# Patient Record
Sex: FEMALE | Race: WHITE | URBAN - METROPOLITAN AREA
[De-identification: names, ages, dates, MRNs, and addresses within clinical notes are randomized per-mention and may not be internally consistent; named-entity substitution may affect disease eponyms.]

---

## 2021-04-02 ENCOUNTER — OFFICE VISIT (OUTPATIENT)
Dept: URBAN - METROPOLITAN AREA CLINIC 82 | Facility: CLINIC | Age: 71
End: 2021-04-02
Payer: COMMERCIAL

## 2021-04-02 DIAGNOSIS — H52.03 HYPERMETROPIA, BILATERAL: Primary | ICD-10-CM

## 2021-04-02 DIAGNOSIS — H02.126: ICD-10-CM

## 2021-04-02 PROCEDURE — 92004 COMPRE OPH EXAM NEW PT 1/>: CPT | Performed by: OPTOMETRIST

## 2021-04-02 ASSESSMENT — VISUAL ACUITY
OS: NLP
OD: 20/30

## 2021-04-02 ASSESSMENT — INTRAOCULAR PRESSURE: OD: 18

## 2021-04-02 NOTE — IMPRESSION/PLAN
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 Left. Plan: Will continue to observe.

## 2022-06-17 ENCOUNTER — OFFICE VISIT (OUTPATIENT)
Dept: URBAN - METROPOLITAN AREA CLINIC 82 | Facility: CLINIC | Age: 72
End: 2022-06-17
Payer: COMMERCIAL

## 2022-06-17 DIAGNOSIS — H52.03 HYPERMETROPIA, BILATERAL: Primary | ICD-10-CM

## 2022-06-17 PROCEDURE — 92014 COMPRE OPH EXAM EST PT 1/>: CPT | Performed by: OPTOMETRIST

## 2022-06-17 PROCEDURE — 92082 INTERMEDIATE VISUAL FIELD XM: CPT | Performed by: OPTOMETRIST

## 2022-06-17 ASSESSMENT — INTRAOCULAR PRESSURE: OD: 16

## 2022-06-17 ASSESSMENT — KERATOMETRY: OD: 41.38

## 2022-06-17 ASSESSMENT — VISUAL ACUITY: OS: 20/25

## 2022-06-17 NOTE — IMPRESSION/PLAN
Impression: Hypermetropia, bilateral: H52.03. Plan: New glasses RX given today. Discussed diagnosis of cataract OD in detail with patient. No treatment currently recommended. The patient will monitor vision changes and contact us with any decrease in vision.

## 2023-10-02 ENCOUNTER — OFFICE VISIT (OUTPATIENT)
Dept: URBAN - METROPOLITAN AREA CLINIC 82 | Facility: CLINIC | Age: 73
End: 2023-10-02
Payer: COMMERCIAL

## 2023-10-02 DIAGNOSIS — Z13.5 ENCOUNTER FOR SCREENING FOR EYE AND EAR DISORDERS: ICD-10-CM

## 2023-10-02 DIAGNOSIS — H52.03 HYPERMETROPIA, BILATERAL: Primary | ICD-10-CM

## 2023-10-02 PROCEDURE — 92014 COMPRE OPH EXAM EST PT 1/>: CPT | Performed by: OPTOMETRIST

## 2023-10-02 PROCEDURE — 92082 INTERMEDIATE VISUAL FIELD XM: CPT | Performed by: OPTOMETRIST

## 2023-10-02 ASSESSMENT — VISUAL ACUITY
OD: 20/30
OS: NLP

## 2023-10-02 ASSESSMENT — KERATOMETRY: OD: 41.50

## 2023-10-02 ASSESSMENT — INTRAOCULAR PRESSURE: OD: 22

## 2024-04-03 ENCOUNTER — OFFICE VISIT (OUTPATIENT)
Dept: URBAN - METROPOLITAN AREA CLINIC 82 | Facility: CLINIC | Age: 74
End: 2024-04-03
Payer: COMMERCIAL

## 2024-04-03 DIAGNOSIS — H40.051 OCULAR HYPERTENSION, RIGHT EYE: Primary | ICD-10-CM

## 2024-04-03 DIAGNOSIS — H25.11 AGE-RELATED NUCLEAR CATARACT, RIGHT EYE: ICD-10-CM

## 2024-04-03 DIAGNOSIS — H43.829 VITREOMACULAR TRACTION OF EYE: ICD-10-CM

## 2024-04-03 PROCEDURE — 92133 CPTRZD OPH DX IMG PST SGM ON: CPT | Performed by: OPTOMETRIST

## 2024-04-03 PROCEDURE — 99213 OFFICE O/P EST LOW 20 MIN: CPT | Performed by: OPTOMETRIST

## 2024-04-03 ASSESSMENT — INTRAOCULAR PRESSURE: OD: 16

## 2024-05-17 ENCOUNTER — OFFICE VISIT (OUTPATIENT)
Dept: URBAN - METROPOLITAN AREA CLINIC 85 | Facility: CLINIC | Age: 74
End: 2024-05-17
Payer: COMMERCIAL

## 2024-05-17 DIAGNOSIS — H43.391 OTHER VITREOUS OPACITIES, RIGHT EYE: Primary | ICD-10-CM

## 2024-05-17 PROCEDURE — 92134 CPTRZ OPH DX IMG PST SGM RTA: CPT | Performed by: OPHTHALMOLOGY

## 2024-05-17 PROCEDURE — 99204 OFFICE O/P NEW MOD 45 MIN: CPT | Performed by: OPHTHALMOLOGY

## 2024-05-17 ASSESSMENT — INTRAOCULAR PRESSURE
OD: 14
OS: 29

## 2024-07-15 ENCOUNTER — OFFICE VISIT (OUTPATIENT)
Dept: URBAN - METROPOLITAN AREA CLINIC 82 | Facility: CLINIC | Age: 74
End: 2024-07-15
Payer: COMMERCIAL

## 2024-07-15 DIAGNOSIS — H25.11 AGE-RELATED NUCLEAR CATARACT, RIGHT EYE: ICD-10-CM

## 2024-07-15 DIAGNOSIS — H43.811 VITREOUS DEGENERATION, RIGHT EYE: Primary | ICD-10-CM

## 2024-07-15 PROCEDURE — 99213 OFFICE O/P EST LOW 20 MIN: CPT | Performed by: OPTOMETRIST

## 2024-07-15 ASSESSMENT — INTRAOCULAR PRESSURE
OD: 19
OD: 17

## 2025-08-28 ENCOUNTER — OFFICE VISIT (OUTPATIENT)
Dept: URBAN - METROPOLITAN AREA CLINIC 82 | Facility: CLINIC | Age: 75
End: 2025-08-28
Payer: COMMERCIAL

## 2025-08-28 DIAGNOSIS — H25.11 AGE-RELATED NUCLEAR CATARACT, RIGHT EYE: ICD-10-CM

## 2025-08-28 DIAGNOSIS — Z13.5 ENCOUNTER FOR SCREENING FOR EYE AND EAR DISORDERS: ICD-10-CM

## 2025-08-28 DIAGNOSIS — H52.03 HYPERMETROPIA, BILATERAL: Primary | ICD-10-CM

## 2025-08-28 DIAGNOSIS — H17.9 CORNEAL SCAR: ICD-10-CM

## 2025-08-28 PROCEDURE — 92014 COMPRE OPH EXAM EST PT 1/>: CPT

## 2025-08-28 ASSESSMENT — INTRAOCULAR PRESSURE: OD: 14

## 2025-08-28 ASSESSMENT — KERATOMETRY: OD: 41.00

## 2025-08-28 ASSESSMENT — VISUAL ACUITY: OD: 20/20
